# Patient Record
Sex: FEMALE | Race: WHITE | NOT HISPANIC OR LATINO | Employment: UNEMPLOYED | URBAN - METROPOLITAN AREA
[De-identification: names, ages, dates, MRNs, and addresses within clinical notes are randomized per-mention and may not be internally consistent; named-entity substitution may affect disease eponyms.]

---

## 2024-09-18 ENCOUNTER — TELEPHONE (OUTPATIENT)
Dept: ADMINISTRATIVE | Facility: OTHER | Age: 84
End: 2024-09-18

## 2024-09-18 NOTE — TELEPHONE ENCOUNTER
----- Message from Radha NOLAND sent at 9/18/2024  8:26 AM EDT -----  Regarding: Care Gap Request  09/18/24 8:26 AM    Amber, our patient Mai Lubin has had Medicare AWV completed/performed. Please assist in updating the patient chart by pulling the Care Everywhere (CE) document. The date of service is 3/14/24.     Thank you,  Radha Vega  [facility]

## 2024-09-19 ENCOUNTER — OFFICE VISIT (OUTPATIENT)
Dept: FAMILY MEDICINE CLINIC | Facility: CLINIC | Age: 84
End: 2024-09-19

## 2024-09-19 VITALS
OXYGEN SATURATION: 97 % | HEIGHT: 65 IN | BODY MASS INDEX: 23.39 KG/M2 | TEMPERATURE: 98 F | SYSTOLIC BLOOD PRESSURE: 122 MMHG | DIASTOLIC BLOOD PRESSURE: 82 MMHG | HEART RATE: 68 BPM | WEIGHT: 140.4 LBS

## 2024-09-19 DIAGNOSIS — I10 PRIMARY HYPERTENSION: Primary | ICD-10-CM

## 2024-09-19 DIAGNOSIS — R41.3 MEMORY DEFICIT: ICD-10-CM

## 2024-09-19 DIAGNOSIS — Z13.29 SCREENING FOR THYROID DISORDER: ICD-10-CM

## 2024-09-19 DIAGNOSIS — Z13.1 SCREENING FOR DIABETES MELLITUS: ICD-10-CM

## 2024-09-19 DIAGNOSIS — Z13.220 SCREENING FOR LIPID DISORDERS: ICD-10-CM

## 2024-09-19 DIAGNOSIS — Z13.0 SCREENING FOR DEFICIENCY ANEMIA: ICD-10-CM

## 2024-09-19 PROBLEM — F43.22 ADJUSTMENT DISORDER WITH ANXIOUS MOOD: Status: ACTIVE | Noted: 2023-11-07

## 2024-09-19 PROCEDURE — 99204 OFFICE O/P NEW MOD 45 MIN: CPT

## 2024-09-19 RX ORDER — AMLODIPINE BESYLATE 2.5 MG/1
2.5 TABLET ORAL DAILY
COMMUNITY
Start: 2024-07-29

## 2024-09-19 NOTE — TELEPHONE ENCOUNTER
Upon review of the In Basket request and the patient's chart, initial outreach has been made via telephone call to facility. Please see Contacts section for details.     Thank you  Lazara Jensen

## 2024-09-19 NOTE — ASSESSMENT & PLAN NOTE
Patient with reported memory deficit by herself and her family members present today.  She has never had any memory tests or imaging completed before.  We administered a MoCA test which showed a score of 21/30, mild cognitive impairment. We also discussed NeuroQuant test, but I am not positive that it will change our course of treatment at this time, so I recommend discussing this with geriatrics.     Due to their memory concerns and MoCA findings, would recommend referral to geriatrics for an evaluation and to discuss possible prevention techniques for worsening memory.  I believe they could also be good candidates for extra support services in the home as well.  Patient and her family are agreeable with referral to geriatrics and will call for an appointment.  Orders:    Ambulatory Referral to Senior Care; Future     Verbal consent received from parents to give baby the  medications: Vit K, EES opthalmic ointment, and Hep B vaccine.

## 2024-09-19 NOTE — ASSESSMENT & PLAN NOTE
Patient establishing care today. Has been on amlodipine x 10 years (approx) for HTN. States her BP at home runs in the 120s/80s. Continue medication as prescribed, will follow up every 6 months for HTN reassessment. Labs in 3/2024 WNL, no concerns. Will get labs prior to next visit.

## 2024-09-19 NOTE — PROGRESS NOTES
Ambulatory Visit  Name: Mai Lubin      : 1940      MRN: [id number]  Encounter Provider: Soraida Germain PA-C  Encounter Date: 2024   Encounter department: [facility]    Assessment & Plan  Primary hypertension  Patient establishing care today. Has been on amlodipine x 10 years (approx) for HTN. States her BP at home runs in the 120s/80s. Continue medication as prescribed, will follow up every 6 months for HTN reassessment. Labs in 3/2024 WNL, no concerns. Will get labs prior to next visit.        Memory deficit  Patient with reported memory deficit by herself and her family members present today.  She has never had any memory tests or imaging completed before.  We administered a MoCA test which showed a score of 21/30, mild cognitive impairment. We also discussed NeuroQuant test, but I am not positive that it will change our course of treatment at this time, so I recommend discussing this with geriatrics.     Due to their memory concerns and MoCA findings, would recommend referral to geriatrics for an evaluation and to discuss possible prevention techniques for worsening memory.  I believe they could also be good candidates for extra support services in the home as well.  Patient and her family are agreeable with referral to geriatrics and will call for an appointment.  Orders:    Ambulatory Referral to Senior Care; Future    Screening for deficiency anemia    Orders:    CBC and differential; Future    Screening for diabetes mellitus    Orders:    Comprehensive metabolic panel; Future    Screening for lipid disorders    Orders:    Lipid Panel with Direct LDL reflex; Future    Screening for thyroid disorder    Orders:    TSH, 3rd generation with Free T4 reflex; Future       History of Present Illness     Patient presents today to establish care with our practice.  She is accompanied by her  and son.    She has a history of hypertension which was diagnosed about 10  years ago.  She was started on amlodipine 2.5 mg which has given her good control of her blood pressure for several years.  She checks her blood pressure every so often at home and it has been running in the 120/80 range.    Her and her family are concerned about short-term memory deficits that they have noticed for the past year.  Memory changes occurred right after moving here and being in the new environment.  They states she has a hard time with her typical routine and at times will forget things that happened 10 to 20 seconds ago.  She has a really good long-term memory but they are primarily concerned about the short-term deficit.  Patient has noticed a change in herself as well, she used to work in memory care and she is concerned about this change and would like to discuss possible prevention strategies if needed.  She has never had any memory tests or imaging completed before.    Cognitive Impairment:  History:     History of head trauma: No      History of alcohol abuse: No      Family history of dementia: No      Memory issues noticed since: 1 year    Memory issues noticed by: patient and family member    Form of memory affected: short term memory    Memory progression: gradually worsening    Onset: sudden        Review of Systems   Constitutional:  Negative for chills, fever and unexpected weight change.   Respiratory:  Negative for cough, chest tightness and shortness of breath.    Cardiovascular:  Negative for chest pain and palpitations.   Gastrointestinal:  Negative for abdominal pain, blood in stool and vomiting.   Genitourinary:  Negative for dysuria, hematuria and menstrual problem.   Musculoskeletal:  Negative for arthralgias, back pain and myalgias.   Neurological:  Negative for dizziness, seizures, syncope and headaches.   Hematological:  Does not bruise/bleed easily.   Psychiatric/Behavioral:  Positive for memory loss. Negative for behavioral problems and confusion.         Memory changes  "(short term)   All other systems reviewed and are negative.          Objective     /82 (BP Location: Left arm, Patient Position: Sitting, Cuff Size: Standard)   Pulse 68   Temp 98 °F (36.7 °C) (Temporal)   Ht 5' 5\" (1.651 m)   Wt 63.7 kg (140 lb 6.4 oz)   SpO2 97%   BMI 23.36 kg/m²     Physical Exam  Vitals and nursing note reviewed.   Constitutional:       General: She is not in acute distress.     Appearance: Normal appearance. She is normal weight. She is not ill-appearing.   HENT:      Head: Normocephalic and atraumatic.   Cardiovascular:      Rate and Rhythm: Normal rate and regular rhythm.   Pulmonary:      Effort: Pulmonary effort is normal. No respiratory distress.      Breath sounds: Normal breath sounds.   Musculoskeletal:      Right lower leg: No edema.      Left lower leg: No edema.   Skin:     General: Skin is warm and dry.      Coloration: Skin is not cyanotic or pale.   Neurological:      General: No focal deficit present.      Mental Status: She is alert and oriented to person, place, and time. Mental status is at baseline.   Psychiatric:         Mood and Affect: Mood normal.         Behavior: Behavior normal.         Judgment: Judgment normal.         "

## 2024-09-20 NOTE — TELEPHONE ENCOUNTER
Upon review of the In Basket request we were able to locate, review, and update the patient chart as requested for Medicare AW.    Any additional questions or concerns should be emailed to the Practice Liaisons via the appropriate education email address, please do not reply via In Basket.    Thank you  Lazara Jensen   [facility]

## 2024-09-20 NOTE — TELEPHONE ENCOUNTER
09/20/24 12:41 PM     Chart reviewed for Medicare AWV was/were submitted to the patient's insurance.     Lazara Jensen   [facility]

## 2024-12-17 ENCOUNTER — TELEPHONE (OUTPATIENT)
Age: 84
End: 2024-12-17

## 2024-12-17 NOTE — TELEPHONE ENCOUNTER
Left message  w/son, Ed regarding moving Consult appt up form 2/10/25 to 12/18/24 @ 1:30 adeline Souza    If Consult appointment is moved up, please move Care Conference appt. to 2/10/25 @ 8:30 am w/ Radha Souza.

## 2025-02-05 ENCOUNTER — TELEPHONE (OUTPATIENT)
Age: 85
End: 2025-02-05

## 2025-02-05 NOTE — TELEPHONE ENCOUNTER
"[facility]  [address], [address]  [place] [address]  [phone]    Social Work Intake    MSW spoke with patient's son, Ed, to complete social work intake via phone, for patient's upcoming geriatric assessment on 02/10/2025 with NOEL Menjivar.     Social/Family History   Marital status:                                        Spouse's Name:  Dragan     Does patient have children?  Two sons and one daughter. Patient and spouse live with sonYogesh.  Daughter in [place].  Son, Ed, in [place]  (If yes, where do the children live?)   Family members assisting patient at home: Spouse and son   Who is available to provide care in case of illness or crisis? Son, Ed        Employment and Education   Education: Highest Level of Education: BS Nursing    Retired: Yes   Type of employment: Nurse   :  Spouse     Benefits (if applicable): Spouse is not receiving benefits     Lifestyle/Community Services   Clubs and Organizations: No, just moved the their current area about a year ago and lost some contacts   Have you ever used any community-based services (ex. homecare, waiver services, meal delivery service, or respite care?): No   If \"no\" are you interested in information about these services? Not at this time        Financial (info assists with future planning options; not required)  Total Monthly income: Not disclosed     Source of income: Social Security   Meet current needs? Yes     Funds available for home care? Yes     Funds available for nursing home?  Unsure     Do you rent or own your home? Own - live in son's home (moved in with son in Sept 2024)      ACP REVIEW:  Does patient have POA: No  Does patient have a Living will: No  Any legal assistance needed for healthcare planning?: Yes    For all patients, we encourage seeing a  to establish a Financial Power of , a Health Care Power of , and an Advanced Directive (living will). " "Particularly for patients experiencing memory concerns, we strongly recommend that this is completed as soon as possible.     Safety Assessment  Is the patient still driving? Have they had any recent citations or accidents? No, she hasn't driven since moving to the area but does still have an active license  Is the patient taking medications as prescribed?  Reports she doesn't really have any medications (one pill a day)  Is there a system in place for medication management? Son is now managing organizing medications and then patient takes it independent  Are there firearms or weapons in the home? No    Recommendations per [employer]: removing them from the home, keep ammunition stored separately, encourage patient to consider \"gifting\" them, if necessary, ask local law enforcement for assistance in removing the firearms from the home. The family may receive compensation from a gun buy-back program.    Does the patient use an assistive device? No Do they have any difficulty with gait or balance? No  Does the patient have difficulty with hearing or vision?  No    Any falls in the last year? No    Any history of wandering? No    Does the patient live alone?  Home with spouse, son and DIL (living in son's home)  What is the layout of the home: 1SH  Do you feel comfortable leaving the patient home alone? Yes    Do you have any concerns about the patient's cooking or eating habits?  Patient no longer really remembers recipes to meals.  They changed to meals that are more microwave/light meal prep  Do you have working smoke detectors and fire extinguishers in the home? Yes    Have you thought about when it will no longer be safe for the patient to be left alone or any future planning if their memory were to decline and they have an increase in care needs? Son bought this house to accommodate patient and spouse at this time and this has been going well.  Family does not have a future plan at     This visit was " prompted by spouse having had gone to the hospital a little over a year ago and patient was spending more time with children who noticed some of the cognitive deficits.     Education/resources to be provided to patient/family in person or via mail:   The following resources will be provided during the visit:   -Elder Law  List  -[facility] Legal and Financial Planning

## 2025-02-10 ENCOUNTER — CONSULT (OUTPATIENT)
Age: 85
End: 2025-02-10
Payer: COMMERCIAL

## 2025-02-10 VITALS
OXYGEN SATURATION: 98 % | HEIGHT: 65 IN | BODY MASS INDEX: 22.69 KG/M2 | TEMPERATURE: 98.7 F | SYSTOLIC BLOOD PRESSURE: 122 MMHG | WEIGHT: 136.2 LBS | DIASTOLIC BLOOD PRESSURE: 76 MMHG | HEART RATE: 77 BPM

## 2025-02-10 DIAGNOSIS — I10 PRIMARY HYPERTENSION: ICD-10-CM

## 2025-02-10 DIAGNOSIS — G31.84 MCI (MILD COGNITIVE IMPAIRMENT): Primary | ICD-10-CM

## 2025-02-10 DIAGNOSIS — F43.22 ADJUSTMENT DISORDER WITH ANXIOUS MOOD: ICD-10-CM

## 2025-02-10 PROCEDURE — 99205 OFFICE O/P NEW HI 60 MIN: CPT | Performed by: NURSE PRACTITIONER

## 2025-02-10 RX ORDER — ASPIRIN 325 MG
5 TABLET ORAL DAILY
COMMUNITY

## 2025-02-10 NOTE — PROGRESS NOTES
[facility]  [address], [address]  [place] [address]  [phone]    Social Work Intake follow up    Mai Lubin presents today for a geriatric assessment, accompanied by spouse and son, Ed.  MSW provided the resources dicussed during intake and added the following resources as requested:   -[facility] list    Additionally, during discussion, MSW made aware that patient does have POA and it is son, Ed.  MSW encouraged to bring these documents to next visit to have them added to patient's chart and expressed understanding of same.  MSW will remain available as needed.

## 2025-02-10 NOTE — PROGRESS NOTES
Assessment & Plan:   Geriatric Evaluation  Memory  Pt independent  with adl, supported for iadls.  Memory loss:  STM loss not affecting function too much  MOCA 21/30.  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  Memory meds:  will discuss at care conference  Cont supportive cares lives with son and , family supportive  Caregiver stress concerns:  none  SW needs this visit:  looking for things to do  Discussed safe environment  Wandering:  No concerns at present.  In the future, if needed, some items to consider would be door video surveillance, ID bracelet, Tile GPS   Home:  No concerns at present.  Ensure pt has phone and reinforce to not use stove or shower while gone.  Level of care:  independent with supports  Fall preventions:  standard, no recent falls  Medication management:  son assists  Driving safety:  she has stopped driving  Scam safety:  Do not answer suspicious mail, phone calls, email, or text message without having second person review d/t safety risk.  Do not give out personal info to questionable sources- read company safety policies for how they might contact you.  Continue to engage in physical, cognitive, social activity.  Cont doing games, puzzles, trivia, current event discussions  Cont daily walks or exercise video  Cont outings with friends and family.  Avoid social isolation.  Referral to cognitive therapy:  will refer to [facility], pt not sure if she is interested.    Optimize chronic and acute conditions  Cont to f/u with providers w/chronic conditions and ensure medication compliance  Last GFR 89, albumin 4.1  "(3/5/3024)  Vascular risk factors:  htn (BP stable, remains on amlodipine, managed by pcp).  Denies dizziness/HA.  Cont to f/u with pcp as directed for chronic management.  Cont dietary and life style interventions to reduce risk factors.  Geriatric syndromes:  memory changes, anxiety  Chronic condition concerns:  stable, no concerns  Ensure good diet (ex Mediterranean diet) and adequate hydration  Monitor for changes in behavior/mood- if noted, notify provider for eval  Encourage mindfulness and positive socialization for general wellness.  Do not overwhelm pt with info.    Do one task at a time. Use slower pace.  Keep to one conversation at a time.  Do not multitask.  Decision making:  At this point, recommend making \"bigger\" decisions with poa/care partner.  In future, if capacity is of concern, would refer to neuropsychology for full eval.  Encourage independence as able.  ACP's:  son Ed is POA, will bring in documents with next visit.    Recommended next follow up: 6 months, pt declines care conference    Mobility  Baseline ambulation: no AD, has cane.  Fall type: none recent  PT OT needs:   no needs identified  Fall precautions    Mood  Baseline mood:  occ gets angry at memory loss  GDS 5  Pharmalogical interventions:  none  Non pharmalogical interventions:  Encourage relaxation techniques, emotional support.      Medication Review  Medications seem appropriate for current conditions  Patient is not at risk for increased side effects due to polypharmacy  Patient is taking the following medications that meet Beer's Criteria to monitor/avoid:  none  Avoid medications that worsen cognition - check with provider or pharmacist before starting new or OTC meds    5.  Other Geriatric Syndromes:  none      HPI:  We had the pleasure of evaluating Mai Lubin who is a 85 y.o. female in Geriatric Consultation today.  Previous MOCA:  21/30.  Comorbidities include memory changes, anxiety, htn.   Ms. Lubin is in the " office with her  , Dragan and son, Ed  She lives with her son.     Patient's work and family history.  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 Two sons and a dgt.  Goes out shopping weekly with dgt.      Memory report per pt's  and son (d/t memory issues): Memory changes over th4e last a3 years which have gotten more noticeable of the last 6 monghs.  She moved about 1 1/2 years ago.  She no longer drives (shoose to stop when she moved- poor sense of direction).  She can do her banking, but son now sits with her.  She does good with the math, more remembering what needs to be paid.   Traditionally she has been in charge of things.. She can use phone to call out.  Her son that they stay with lives from home.  Harder to learn new things.  She isn't able to remember novel she's been reading.  Problem solving is good    Memory report per patient:   STM loss getting worse since the move down.  She can't go out as much.  Everything has changed- it has taken some time to get adjusted.  It is not affecting day to day function.  Her  is not going hungry.  She has fewer bills, but she can still do with assist of son.   She moves around, does good.  She only takes amlodipine in the am.- she is taking well.  She is fully aware of the memory loss and making sure her and her  are safe.  She is reading less.  Not walking as much d/t cold.  Appetite is good, no weight loss.  She has routine for meal prepping.  She gets irritated with memory loss.    Current activities:  Cognitive:  likes cross word and some reader  Physical:  less since  got sick  Social:  not as much d/t move      She has difficulty finding the right word while speaking: No  Patient requires repeat information or ask the same question repeatedly: Yes - more repeats, not more reminders- she worries about when appts are coming.    She has no problems operating household appliance such as TV remote, kitchen appliances,  "computer.    Functional concerns:  Bathing indep  Dressing indep  Feeding yourself indep  Tolieting indep  Continence indep   Transferring indep  Uses : no AD, no recent falls    Can you make your own light meals Yes - harder to do bigger cooking, relying more on prepackaged.  Kids helping her more.  Not forgetting stoves.  Do light cleaning/chores Yes  Do you take care of your own medications No - her son takes care of both, she makes a lot of list  Do you do your own shopping No - she makes her list, her dgt takes her shopping- she needs the list.   Do you handle your own financial affairs such as balancing your checkbook, paying bills, investments: Yes  Do have any difficulties with handling your financial affairs: Yes  Do you use a cell phone Yes - uses very little  Do you drive: No         Psychosocial concerns:  Have you or your family noted any change in your mood or personality:No- more anxious- d/t STM loss  Are you currently or have you been treated in the past for depression or anxiety: Yes  Any hallucination or delusion: No  Sleep Issues: No - no confusion  Hearing and vision issue: No - no pbs with hearing  ADL/IADL:  indep/supported  Appetite/swallow:  no trouble/good  Pain:  OA    Family Review of Behavior [facility]:    pacing. No    agressive/combative behavior. No    agitated. No   wandering. No   resistance to care. No   hoarding/hiding objects.Yes    suspicious  No  withdrawn No  rummaging/pillaging.    misplacing/losing objects Yes  personal hygiene problems.No  forgetfulness of actions Yes     Past Medical, surgical, social, medication and allergy history and patients previous records reviewed.    Family member with dementia and what type?no- no one got as old  Does patient have previous diagnosis of dementia?  STM loss   Does patient take any \"memory medications\"? o  Stroke history No  Have you had any head trauma No  Does patient have history of alcohol abuse No    ROS:  Review of Systems "   Constitutional:  Negative for activity change, appetite change, chills and fatigue.   HENT:  Negative for congestion, hearing loss and trouble swallowing.    Eyes:  Negative for visual disturbance.   Respiratory:  Negative for cough and shortness of breath.    Cardiovascular:  Negative for chest pain.   Gastrointestinal:  Negative for abdominal pain, constipation, diarrhea, nausea and vomiting.   Genitourinary:  Negative for difficulty urinating.   Musculoskeletal:  Positive for arthralgias (occ left hip pain). Negative for back pain and gait problem.   Neurological:  Negative for dizziness and light-headedness.   Psychiatric/Behavioral:  Positive for decreased concentration (forgetful). Negative for dysphoric mood and sleep disturbance. The patient is not nervous/anxious.        Allergies:   Allergies   Allergen Reactions    Elettaria Cardamomum Minuscula Hives     Other Reaction(s): Throat swelled    Hydrocodone-Acetaminophen Hives     CHARTED DURING ABSTRACT    Lutein Hives    Lycopene Hives    Naproxen Other (See Comments) and Hives     Other Reaction(s): Eye edema      CHARTED DURING ABSTRACT    Penicillins Hives     CHARTED DURING ABSTRACT    Red Dye Hives       Medications:      Current Outpatient Medications:     amLODIPine (NORVASC) 2.5 mg tablet, Take 2.5 mg by mouth daily, Disp: , Rfl:     aspirin 325 mg tablet, Take 5 mg by mouth daily, Disp: , Rfl:     Vitals:  Vitals:    02/10/25 0826   BP: 122/76   Pulse: 77   Temp: 98.7 °F (37.1 °C)   SpO2: 98%       History:  Past Medical History:   Diagnosis Date    Hypertension      Past Surgical History:   Procedure Laterality Date    ANKLE SURGERY Right     1990's    REPLACEMENT TOTAL KNEE Right     8/2016     History reviewed. No pertinent family history.  Social History     Socioeconomic History    Marital status: /Civil Union     Spouse name: Not on file    Number of children: Not on file    Years of education: Not on file    Highest education level:  Not on file   Occupational History    Not on file   Tobacco Use    Smoking status: Former     Types: Cigarettes     Passive exposure: Never    Smokeless tobacco: Never   Vaping Use    Vaping status: Never Used   Substance and Sexual Activity    Alcohol use: Never    Drug use: Never    Sexual activity: Not on file   Other Topics Concern    Not on file   Social History Narrative    Not on file     Social Drivers of Health     Financial Resource Strain: Not on file   Food Insecurity: Not on file   Transportation Needs: Not on file   Physical Activity: Not on file   Stress: Not on file   Social Connections: Not on file   Intimate Partner Violence: Not on file   Housing Stability: Not on file       Past Surgical History:   Procedure Laterality Date    ANKLE SURGERY Right     1990's    REPLACEMENT TOTAL KNEE Right     8/2016       Physical Exam  Observed Ambulation:  no AD, steady, sl slower  Physical Exam  Vitals and nursing note reviewed.   Constitutional:       General: She is not in acute distress.     Appearance: Normal appearance. She is well-developed. She is not diaphoretic.   HENT:      Head: Normocephalic.   Cardiovascular:      Rate and Rhythm: Normal rate.      Heart sounds: No murmur heard.     No friction rub. No gallop.   Pulmonary:      Effort: Pulmonary effort is normal. No respiratory distress.      Breath sounds: Normal breath sounds. No wheezing or rales.   Abdominal:      General: Bowel sounds are normal. There is no distension.      Palpations: Abdomen is soft.      Tenderness: There is no abdominal tenderness. There is no rebound.   Musculoskeletal:         General: Normal range of motion.   Skin:     General: Skin is warm and dry.   Neurological:      General: No focal deficit present.      Mental Status: She is alert. Mental status is at baseline.      Comments: Oriented to person, mostly tps  Pleasant, cooperative, forgetful   Psychiatric:         Mood and Affect: Mood normal.         Behavior:  Behavior normal.       I have spent a total time of 61 minutes in caring for this patient on the day of the visit/encounter including Diagnostic results, Prognosis, Risks and benefits of tx options, Instructions for management, Patient and family education, Importance of tx compliance, Risk factor reductions, Impressions, Counseling / Coordination of care, Documenting in the medical record, Reviewing / ordering tests, medicine, procedures  , Obtaining or reviewing history  , and Communicating with other healthcare professionals .

## 2025-03-06 ENCOUNTER — OFFICE VISIT (OUTPATIENT)
Dept: FAMILY MEDICINE CLINIC | Facility: CLINIC | Age: 85
End: 2025-03-06
Payer: COMMERCIAL

## 2025-03-06 VITALS
HEART RATE: 65 BPM | SYSTOLIC BLOOD PRESSURE: 126 MMHG | OXYGEN SATURATION: 98 % | TEMPERATURE: 98.3 F | HEIGHT: 65 IN | WEIGHT: 139.4 LBS | DIASTOLIC BLOOD PRESSURE: 72 MMHG | BODY MASS INDEX: 23.22 KG/M2

## 2025-03-06 DIAGNOSIS — R41.3 MEMORY DEFICIT: ICD-10-CM

## 2025-03-06 DIAGNOSIS — M85.9 LOW BONE DENSITY: ICD-10-CM

## 2025-03-06 DIAGNOSIS — I10 PRIMARY HYPERTENSION: Primary | ICD-10-CM

## 2025-03-06 PROCEDURE — 99214 OFFICE O/P EST MOD 30 MIN: CPT

## 2025-03-06 PROCEDURE — G2211 COMPLEX E/M VISIT ADD ON: HCPCS

## 2025-03-06 NOTE — ASSESSMENT & PLAN NOTE
Now following with geriatrics for memory.  At this time memory appears to be stable.    and son report no recent adverse events with her memory.  She is not going to pursue [place] [facility] for cognitive therapy.

## 2025-03-06 NOTE — ASSESSMENT & PLAN NOTE
Patient seen today for regular 6-month follow-up.  Blood pressure appears to be stable with current regimen of amlodipine 2.5 mg daily.  Continue current medication and continue 6-month follow-ups.  Next appointment will be [facility], lab orders have already been placed for her to receive prior.

## 2025-03-06 NOTE — PROGRESS NOTES
Name: Mai Lubin      : 1940      MRN: [id number]  Encounter Provider: Soraida Germain PA-C  Encounter Date: 3/6/2025   Encounter department: [facility]  :  Assessment & Plan  Primary hypertension  Patient seen today for regular 6-month follow-up.  Blood pressure appears to be stable with current regimen of amlodipine 2.5 mg daily.  Continue current medication and continue 6-month follow-ups.  Next appointment will be [facility], lab orders have already been placed for her to receive prior.       Low bone density  Upon questioning today, patient believes she had low bone density discovered on a DEXA scan several years ago.  She has these records at home, they were completed at a clinic in [place].  She will bring this to me at next visit and we will evaluate need for repeat.  Recommended calcium and vitamin D supplements.  Will check vitamin D with next lab work.  Orders:  •  Vitamin D 25 hydroxy; Future    Memory deficit  Now following with geriatrics for memory.  At this time memory appears to be stable.    and son report no recent adverse events with her memory.  [facility]              History of Present Illness   Patient presents today with her  and son for 6-month follow-up appointment.  Reports her blood pressure has been in good range, does not check it often at home.  Reports no new symptoms.  Has been feeling well.  Saw geriatrics since last appointment and had memory testing completed.  She is choosing not to go through [facility].  She reports that she has tried to write things down more to remember them.      Review of Systems   Constitutional:  Negative for chills, fatigue and fever.   Respiratory:  Negative for cough, chest tightness and shortness of breath.    Cardiovascular:  Negative for chest pain, palpitations and leg swelling.   Neurological:  Negative for dizziness,  "light-headedness and headaches.       Objective   /72 (BP Location: Left arm, Patient Position: Sitting, Cuff Size: Adult)   Pulse 65   Temp 98.3 °F (36.8 °C)   Ht 5' 5\" (1.651 m)   Wt 63.2 kg (139 lb 6.4 oz)   SpO2 98%   BMI 23.20 kg/m²      Physical Exam  Vitals and nursing note reviewed.   Constitutional:       General: She is not in acute distress.     Appearance: Normal appearance. She is normal weight. She is not ill-appearing.   HENT:      Head: Normocephalic and atraumatic.   Cardiovascular:      Rate and Rhythm: Normal rate and regular rhythm.   Pulmonary:      Effort: Pulmonary effort is normal. No respiratory distress.      Breath sounds: Normal breath sounds.   Musculoskeletal:      Right lower leg: No edema.      Left lower leg: No edema.   Skin:     General: Skin is warm and dry.      Coloration: Skin is not cyanotic or pale.   Neurological:      General: No focal deficit present.      Mental Status: She is alert and oriented to person, place, and time. Mental status is at baseline.   Psychiatric:         Mood and Affect: Mood normal.         Behavior: Behavior normal.         Judgment: Judgment normal.         "

## 2025-04-21 DIAGNOSIS — I10 PRIMARY HYPERTENSION: Primary | ICD-10-CM

## 2025-04-21 NOTE — TELEPHONE ENCOUNTER
Reason for call:   [x] Refill   [] Prior Auth  [x] Other: Historical provider    Office:   [x] PCP/Provider - Soraida Germain PA-C   [] Specialty/Provider -     Medication:  amLODIPine (NORVASC) 2.5 mg tablet    Dose/Frequency:  Take 2.5 mg by mouth daily     Quantity: 30    Pharmacy: [address] [facility] [address] [facility]     Local Pharmacy   Does the patient have enough for 3 days?   [x] Yes   [] No - Send as HP to POD    Mail Away Pharmacy   Does the patient have enough for 10 days?   [] Yes   [] No - Send as HP to POD

## 2025-04-22 RX ORDER — AMLODIPINE BESYLATE 2.5 MG/1
2.5 TABLET ORAL DAILY
Qty: 90 TABLET | Refills: 3 | Status: SHIPPED | OUTPATIENT
Start: 2025-04-22